# Patient Record
Sex: MALE | Race: WHITE | URBAN - METROPOLITAN AREA
[De-identification: names, ages, dates, MRNs, and addresses within clinical notes are randomized per-mention and may not be internally consistent; named-entity substitution may affect disease eponyms.]

---

## 2017-12-01 ENCOUNTER — IMPORTED ENCOUNTER (OUTPATIENT)
Dept: URBAN - METROPOLITAN AREA CLINIC 50 | Facility: CLINIC | Age: 47
End: 2017-12-01

## 2018-06-19 NOTE — PATIENT DISCUSSION
Visual field shows possible worsening in OS. Rec: lower IOP in OS. Discussed options. Use Brimonidine in both eyes now.

## 2020-03-10 NOTE — PATIENT DISCUSSION
PT C/O IRRITATION WITH DORZOLAMIDE/TIMOLOL. HOLD DORZOLAMIDE/TIMOLOL AND USE LATANOPROST OU QHS. CONTINUE BRIMONIDINE OU BID.

## 2020-08-11 NOTE — PATIENT DISCUSSION
DISC PHOTOS STABLE OU.  PT DOING BETTER ON LATANOPROST AND BRIMONIDINE.  CONTINUE CURRENT MANAGEMENT.

## 2020-08-31 ENCOUNTER — IMPORTED ENCOUNTER (OUTPATIENT)
Dept: URBAN - METROPOLITAN AREA CLINIC 50 | Facility: CLINIC | Age: 50
End: 2020-08-31

## 2020-12-15 NOTE — PATIENT DISCUSSION
The visual field shows signs of progression in OS. OD is stable. ? Eye rubbing in OS. Discussed pt not to rub eyes and not to sleep on it. Pt will monitor this for now. Will continue current management for now.

## 2021-04-17 ASSESSMENT — TONOMETRY
OD_IOP_MMHG: 17
OD_IOP_MMHG: 15
OS_IOP_MMHG: 15
OS_IOP_MMHG: 17

## 2021-04-17 ASSESSMENT — VISUAL ACUITY
OD_CC: 20/20
OS_CC: 20/20
OD_CC: J1+@ 16 IN
OD_SC: 20/25-
OS_SC: 20/25-
OS_CC: J1+@ 16 IN

## 2021-04-29 NOTE — PATIENT DISCUSSION
OCT stable but IOP's elevating. Recommend repeating SLT for better control . Pt leaving in 2 days for a month. Will schedule in June as his insurance also requires a prior [facility].

## 2021-06-15 NOTE — PROCEDURE NOTE: CLINICAL
PROCEDURE NOTE: SLT OU. Diagnosis: POAG, Moderate. Anesthesia: Topical. Prep: Alphagan 0.15%. Prior to treatment, risks/benefits/alternatives discussed including infection, loss of vision, hemorrhage, cataract, glaucoma, retinal tears or detachment. Lens:  SLT laser lens with goniosol. Power: 1.2/1.2mJ. Total applications: 29/70. Application 537/727 degrees. Patient tolerated procedure well. There were no complications. Post-op instructions given. Post-op IOP = 10/07 mmHg. Angi Canter

## 2021-11-30 NOTE — PATIENT DISCUSSION
VF shows worsening, some may be due to worsening of cataracts. Will consider as new baseline after SLT. Stressed drop compliance. Will continue to monitor on presents drops. Pt understands he may need additional therapy.

## 2023-12-05 ENCOUNTER — COMPREHENSIVE EXAM (OUTPATIENT)
Dept: URBAN - METROPOLITAN AREA CLINIC 50 | Facility: LOCATION | Age: 53
End: 2023-12-05

## 2023-12-05 DIAGNOSIS — H25.13: ICD-10-CM

## 2023-12-05 DIAGNOSIS — H43.813: ICD-10-CM

## 2023-12-05 DIAGNOSIS — H52.4: ICD-10-CM

## 2023-12-05 PROCEDURE — 92015 DETERMINE REFRACTIVE STATE: CPT

## 2023-12-05 PROCEDURE — 92014 COMPRE OPH EXAM EST PT 1/>: CPT

## 2023-12-05 ASSESSMENT — VISUAL ACUITY
OD_CC: 20/20
OS_CC: 20/20
OU_CC: J1+ (-)

## 2023-12-05 ASSESSMENT — TONOMETRY
OS_IOP_MMHG: 13
OD_IOP_MMHG: 12

## 2024-04-19 NOTE — PATIENT DISCUSSION
Recommended artificial tears to use: 1 drop 4x a day in both eyes. Detail Level: Generalized General Sunscreen Counseling: I recommended a broad spectrum sunscreen with a SPF of 30 or higher.  I explained that SPF 30 sunscreens block approximately 97 percent of the sun's harmful rays.  Sunscreens should be applied at least 15 minutes prior to expected sun exposure and then every 2 hours after that as long as sun exposure continues. If swimming or exercising sunscreen should be reapplied every 45 minutes to an hour after getting wet or sweating.  One ounce, or the equivalent of a shot glass full of sunscreen, is adequate to protect the skin not covered by a bathing suit. I also recommended a lip balm with a sunscreen as well. Sun protective clothing can be used in lieu of sunscreen but must be worn the entire time you are exposed to the sun's rays.
